# Patient Record
Sex: FEMALE | Race: WHITE | Employment: OTHER | URBAN - METROPOLITAN AREA
[De-identification: names, ages, dates, MRNs, and addresses within clinical notes are randomized per-mention and may not be internally consistent; named-entity substitution may affect disease eponyms.]

---

## 2021-01-01 ENCOUNTER — APPOINTMENT (OUTPATIENT)
Dept: GENERAL RADIOLOGY | Age: 86
DRG: 535 | End: 2021-01-01
Payer: MEDICARE

## 2021-01-01 ENCOUNTER — APPOINTMENT (OUTPATIENT)
Dept: CT IMAGING | Age: 86
DRG: 535 | End: 2021-01-01
Payer: MEDICARE

## 2021-01-01 ENCOUNTER — HOSPITAL ENCOUNTER (INPATIENT)
Age: 86
LOS: 1 days | DRG: 535 | End: 2021-02-11
Attending: STUDENT IN AN ORGANIZED HEALTH CARE EDUCATION/TRAINING PROGRAM | Admitting: HOSPITALIST
Payer: MEDICARE

## 2021-01-01 VITALS
TEMPERATURE: 99.4 F | OXYGEN SATURATION: 94 % | BODY MASS INDEX: 21.6 KG/M2 | WEIGHT: 110 LBS | HEIGHT: 60 IN | SYSTOLIC BLOOD PRESSURE: 160 MMHG | DIASTOLIC BLOOD PRESSURE: 71 MMHG | RESPIRATION RATE: 17 BRPM | HEART RATE: 112 BPM

## 2021-01-01 DIAGNOSIS — J18.9 PNEUMONIA DUE TO ORGANISM: ICD-10-CM

## 2021-01-01 DIAGNOSIS — J96.21 ACUTE ON CHRONIC RESPIRATORY FAILURE WITH HYPOXIA (HCC): Primary | ICD-10-CM

## 2021-01-01 LAB
A/G RATIO: 0.9 (ref 1.1–2.2)
ALBUMIN SERPL-MCNC: 3.2 G/DL (ref 3.4–5)
ALP BLD-CCNC: 188 U/L (ref 40–129)
ALT SERPL-CCNC: 23 U/L (ref 10–40)
ANION GAP SERPL CALCULATED.3IONS-SCNC: 12 MMOL/L (ref 3–16)
AST SERPL-CCNC: 38 U/L (ref 15–37)
BASOPHILS ABSOLUTE: 0.1 K/UL (ref 0–0.2)
BASOPHILS RELATIVE PERCENT: 0.6 %
BILIRUB SERPL-MCNC: 0.6 MG/DL (ref 0–1)
BILIRUBIN URINE: NEGATIVE
BILIRUBIN URINE: NEGATIVE
BLOOD, URINE: ABNORMAL
BLOOD, URINE: NEGATIVE
BUN BLDV-MCNC: 72 MG/DL (ref 7–20)
CALCIUM SERPL-MCNC: 8.8 MG/DL (ref 8.3–10.6)
CHLORIDE BLD-SCNC: 103 MMOL/L (ref 99–110)
CHLORIDE URINE RANDOM: 22 MMOL/L
CLARITY: ABNORMAL
CLARITY: CLEAR
CO2: 22 MMOL/L (ref 21–32)
COLOR: YELLOW
COLOR: YELLOW
COMMENT UA: ABNORMAL
COMMENT UA: ABNORMAL
CREAT SERPL-MCNC: 2.5 MG/DL (ref 0.6–1.2)
CREATININE URINE: 90.6 MG/DL (ref 28–259)
CRYSTALS, UA: ABNORMAL /HPF
EKG ATRIAL RATE: 69 BPM
EKG DIAGNOSIS: NORMAL
EKG P AXIS: 58 DEGREES
EKG P-R INTERVAL: 142 MS
EKG Q-T INTERVAL: 432 MS
EKG QRS DURATION: 90 MS
EKG QTC CALCULATION (BAZETT): 462 MS
EKG R AXIS: -49 DEGREES
EKG T AXIS: 56 DEGREES
EKG VENTRICULAR RATE: 69 BPM
EOSINOPHIL,URINE: NORMAL
EOSINOPHILS ABSOLUTE: 0.1 K/UL (ref 0–0.6)
EOSINOPHILS RELATIVE PERCENT: 0.6 %
EPITHELIAL CELLS, UA: 13 /HPF (ref 0–5)
EPITHELIAL CELLS, UA: 4 /HPF (ref 0–5)
GFR AFRICAN AMERICAN: 22
GFR NON-AFRICAN AMERICAN: 18
GLOBULIN: 3.6 G/DL
GLUCOSE BLD-MCNC: 105 MG/DL (ref 70–99)
GLUCOSE URINE: NEGATIVE MG/DL
GLUCOSE URINE: NEGATIVE MG/DL
HCT VFR BLD CALC: 34.2 % (ref 36–48)
HEMOGLOBIN: 10.9 G/DL (ref 12–16)
HYALINE CASTS: 8 /LPF (ref 0–8)
KETONES, URINE: NEGATIVE MG/DL
KETONES, URINE: NEGATIVE MG/DL
LACTIC ACID: 1.8 MMOL/L (ref 0.4–2)
LEUKOCYTE ESTERASE, URINE: ABNORMAL
LEUKOCYTE ESTERASE, URINE: NEGATIVE
LYMPHOCYTES ABSOLUTE: 0.7 K/UL (ref 1–5.1)
LYMPHOCYTES RELATIVE PERCENT: 5.1 %
MCH RBC QN AUTO: 30.3 PG (ref 26–34)
MCHC RBC AUTO-ENTMCNC: 31.9 G/DL (ref 31–36)
MCV RBC AUTO: 94.8 FL (ref 80–100)
MICROSCOPIC EXAMINATION: YES
MICROSCOPIC EXAMINATION: YES
MONOCYTES ABSOLUTE: 0.8 K/UL (ref 0–1.3)
MONOCYTES RELATIVE PERCENT: 6.2 %
NEUTROPHILS ABSOLUTE: 11.7 K/UL (ref 1.7–7.7)
NEUTROPHILS RELATIVE PERCENT: 87.5 %
NITRITE, URINE: NEGATIVE
NITRITE, URINE: NEGATIVE
PDW BLD-RTO: 14.5 % (ref 12.4–15.4)
PH UA: 5.5 (ref 5–8)
PH UA: 5.5 (ref 5–8)
PLATELET # BLD: 450 K/UL (ref 135–450)
PMV BLD AUTO: 8.3 FL (ref 5–10.5)
POTASSIUM REFLEX MAGNESIUM: 4.9 MMOL/L (ref 3.5–5.1)
POTASSIUM, UR: 45.8 MMOL/L
PRO-BNP: 1634 PG/ML (ref 0–449)
PROCALCITONIN: 0.5 NG/ML (ref 0–0.15)
PROTEIN PROTEIN: 43 MG/DL
PROTEIN UA: 30 MG/DL
PROTEIN UA: 30 MG/DL
RBC # BLD: 3.6 M/UL (ref 4–5.2)
RBC UA: 221 /HPF (ref 0–4)
RBC UA: ABNORMAL /HPF (ref 0–4)
RENAL EPITHELIAL, UA: ABNORMAL /HPF (ref 0–1)
SARS-COV-2, NAAT: NOT DETECTED
SODIUM BLD-SCNC: 137 MMOL/L (ref 136–145)
SODIUM URINE: 40 MMOL/L
SPECIFIC GRAVITY UA: 1.02 (ref 1–1.03)
SPECIFIC GRAVITY UA: 1.02 (ref 1–1.03)
TOTAL PROTEIN: 6.8 G/DL (ref 6.4–8.2)
TROPONIN: 0.05 NG/ML
TROPONIN: 0.06 NG/ML
UREA NITROGEN, UR: 613.8 MG/DL (ref 800–1666)
URINE REFLEX TO CULTURE: YES
URINE TYPE: ABNORMAL
URINE TYPE: ABNORMAL
UROBILINOGEN, URINE: 1 E.U./DL
UROBILINOGEN, URINE: 1 E.U./DL
WBC # BLD: 13.3 K/UL (ref 4–11)
WBC UA: 12 /HPF (ref 0–5)
WBC UA: 13 /HPF (ref 0–5)

## 2021-01-01 PROCEDURE — 6360000002 HC RX W HCPCS: Performed by: HOSPITALIST

## 2021-01-01 PROCEDURE — 92526 ORAL FUNCTION THERAPY: CPT

## 2021-01-01 PROCEDURE — 82436 ASSAY OF URINE CHLORIDE: CPT

## 2021-01-01 PROCEDURE — 82570 ASSAY OF URINE CREATININE: CPT

## 2021-01-01 PROCEDURE — U0002 COVID-19 LAB TEST NON-CDC: HCPCS

## 2021-01-01 PROCEDURE — 85025 COMPLETE CBC W/AUTO DIFF WBC: CPT

## 2021-01-01 PROCEDURE — 92610 EVALUATE SWALLOWING FUNCTION: CPT

## 2021-01-01 PROCEDURE — 2580000003 HC RX 258: Performed by: STUDENT IN AN ORGANIZED HEALTH CARE EDUCATION/TRAINING PROGRAM

## 2021-01-01 PROCEDURE — 87086 URINE CULTURE/COLONY COUNT: CPT

## 2021-01-01 PROCEDURE — 83605 ASSAY OF LACTIC ACID: CPT

## 2021-01-01 PROCEDURE — 70450 CT HEAD/BRAIN W/O DYE: CPT

## 2021-01-01 PROCEDURE — 83880 ASSAY OF NATRIURETIC PEPTIDE: CPT

## 2021-01-01 PROCEDURE — 80053 COMPREHEN METABOLIC PANEL: CPT

## 2021-01-01 PROCEDURE — 87205 SMEAR GRAM STAIN: CPT

## 2021-01-01 PROCEDURE — 6360000002 HC RX W HCPCS: Performed by: NURSE PRACTITIONER

## 2021-01-01 PROCEDURE — 93010 ELECTROCARDIOGRAM REPORT: CPT | Performed by: INTERNAL MEDICINE

## 2021-01-01 PROCEDURE — 2580000003 HC RX 258: Performed by: HOSPITALIST

## 2021-01-01 PROCEDURE — 84133 ASSAY OF URINE POTASSIUM: CPT

## 2021-01-01 PROCEDURE — 36415 COLL VENOUS BLD VENIPUNCTURE: CPT

## 2021-01-01 PROCEDURE — 84300 ASSAY OF URINE SODIUM: CPT

## 2021-01-01 PROCEDURE — 99222 1ST HOSP IP/OBS MODERATE 55: CPT | Performed by: ORTHOPAEDIC SURGERY

## 2021-01-01 PROCEDURE — 2700000000 HC OXYGEN THERAPY PER DAY

## 2021-01-01 PROCEDURE — 99284 EMERGENCY DEPT VISIT MOD MDM: CPT

## 2021-01-01 PROCEDURE — 84540 ASSAY OF URINE/UREA-N: CPT

## 2021-01-01 PROCEDURE — 84484 ASSAY OF TROPONIN QUANT: CPT

## 2021-01-01 PROCEDURE — 94760 N-INVAS EAR/PLS OXIMETRY 1: CPT

## 2021-01-01 PROCEDURE — 72170 X-RAY EXAM OF PELVIS: CPT

## 2021-01-01 PROCEDURE — 73100 X-RAY EXAM OF WRIST: CPT

## 2021-01-01 PROCEDURE — 6360000002 HC RX W HCPCS: Performed by: STUDENT IN AN ORGANIZED HEALTH CARE EDUCATION/TRAINING PROGRAM

## 2021-01-01 PROCEDURE — 73140 X-RAY EXAM OF FINGER(S): CPT

## 2021-01-01 PROCEDURE — 84145 PROCALCITONIN (PCT): CPT

## 2021-01-01 PROCEDURE — 93005 ELECTROCARDIOGRAM TRACING: CPT | Performed by: STUDENT IN AN ORGANIZED HEALTH CARE EDUCATION/TRAINING PROGRAM

## 2021-01-01 PROCEDURE — 94669 MECHANICAL CHEST WALL OSCILL: CPT

## 2021-01-01 PROCEDURE — 2060000000 HC ICU INTERMEDIATE R&B

## 2021-01-01 PROCEDURE — 71045 X-RAY EXAM CHEST 1 VIEW: CPT

## 2021-01-01 PROCEDURE — 84156 ASSAY OF PROTEIN URINE: CPT

## 2021-01-01 PROCEDURE — 81001 URINALYSIS AUTO W/SCOPE: CPT

## 2021-01-01 RX ORDER — POTASSIUM CHLORIDE 20 MEQ/1
40 TABLET, EXTENDED RELEASE ORAL PRN
Status: DISCONTINUED | OUTPATIENT
Start: 2021-01-01 | End: 2021-01-01

## 2021-01-01 RX ORDER — ACETAMINOPHEN 650 MG/1
650 SUPPOSITORY RECTAL EVERY 6 HOURS PRN
Status: DISCONTINUED | OUTPATIENT
Start: 2021-01-01 | End: 2021-02-12 | Stop reason: HOSPADM

## 2021-01-01 RX ORDER — HEPARIN SODIUM 5000 [USP'U]/ML
5000 INJECTION, SOLUTION INTRAVENOUS; SUBCUTANEOUS EVERY 8 HOURS SCHEDULED
Status: DISCONTINUED | OUTPATIENT
Start: 2021-01-01 | End: 2021-02-12 | Stop reason: HOSPADM

## 2021-01-01 RX ORDER — MORPHINE SULFATE 2 MG/ML
2 INJECTION, SOLUTION INTRAMUSCULAR; INTRAVENOUS ONCE
Status: COMPLETED | OUTPATIENT
Start: 2021-01-01 | End: 2021-01-01

## 2021-01-01 RX ORDER — OLANZAPINE 10 MG/1
5 INJECTION, POWDER, LYOPHILIZED, FOR SOLUTION INTRAMUSCULAR ONCE
Status: DISCONTINUED | OUTPATIENT
Start: 2021-01-01 | End: 2021-02-12 | Stop reason: HOSPADM

## 2021-01-01 RX ORDER — SODIUM CHLORIDE 9 MG/ML
INJECTION, SOLUTION INTRAVENOUS CONTINUOUS
Status: DISCONTINUED | OUTPATIENT
Start: 2021-01-01 | End: 2021-02-12 | Stop reason: HOSPADM

## 2021-01-01 RX ORDER — HALOPERIDOL 5 MG/ML
4 INJECTION INTRAMUSCULAR ONCE
Status: COMPLETED | OUTPATIENT
Start: 2021-01-01 | End: 2021-01-01

## 2021-01-01 RX ORDER — PROMETHAZINE HYDROCHLORIDE 25 MG/1
12.5 TABLET ORAL EVERY 6 HOURS PRN
Status: DISCONTINUED | OUTPATIENT
Start: 2021-01-01 | End: 2021-02-12 | Stop reason: HOSPADM

## 2021-01-01 RX ORDER — SODIUM CHLORIDE 0.9 % (FLUSH) 0.9 %
10 SYRINGE (ML) INJECTION PRN
Status: DISCONTINUED | OUTPATIENT
Start: 2021-01-01 | End: 2021-02-12 | Stop reason: HOSPADM

## 2021-01-01 RX ORDER — POLYETHYLENE GLYCOL 3350 17 G/17G
17 POWDER, FOR SOLUTION ORAL DAILY PRN
Status: DISCONTINUED | OUTPATIENT
Start: 2021-01-01 | End: 2021-02-12 | Stop reason: HOSPADM

## 2021-01-01 RX ORDER — SODIUM CHLORIDE 0.9 % (FLUSH) 0.9 %
10 SYRINGE (ML) INJECTION EVERY 12 HOURS SCHEDULED
Status: DISCONTINUED | OUTPATIENT
Start: 2021-01-01 | End: 2021-02-12 | Stop reason: HOSPADM

## 2021-01-01 RX ORDER — POTASSIUM CHLORIDE 7.45 MG/ML
10 INJECTION INTRAVENOUS PRN
Status: DISCONTINUED | OUTPATIENT
Start: 2021-01-01 | End: 2021-01-01

## 2021-01-01 RX ORDER — ONDANSETRON 2 MG/ML
4 INJECTION INTRAMUSCULAR; INTRAVENOUS EVERY 6 HOURS PRN
Status: DISCONTINUED | OUTPATIENT
Start: 2021-01-01 | End: 2021-02-12 | Stop reason: HOSPADM

## 2021-01-01 RX ORDER — ACETAMINOPHEN 325 MG/1
650 TABLET ORAL EVERY 6 HOURS PRN
Status: DISCONTINUED | OUTPATIENT
Start: 2021-01-01 | End: 2021-02-12 | Stop reason: HOSPADM

## 2021-01-01 RX ORDER — MAGNESIUM SULFATE IN WATER 40 MG/ML
2000 INJECTION, SOLUTION INTRAVENOUS PRN
Status: DISCONTINUED | OUTPATIENT
Start: 2021-01-01 | End: 2021-01-01

## 2021-01-01 RX ORDER — LORAZEPAM 2 MG/ML
1 INJECTION INTRAMUSCULAR
Status: DISCONTINUED | OUTPATIENT
Start: 2021-01-01 | End: 2021-02-12 | Stop reason: HOSPADM

## 2021-01-01 RX ORDER — HALOPERIDOL 5 MG/ML
2 INJECTION INTRAMUSCULAR ONCE
Status: COMPLETED | OUTPATIENT
Start: 2021-01-01 | End: 2021-01-01

## 2021-01-01 RX ORDER — MORPHINE SULFATE 2 MG/ML
2 INJECTION, SOLUTION INTRAMUSCULAR; INTRAVENOUS
Status: DISCONTINUED | OUTPATIENT
Start: 2021-01-01 | End: 2021-02-12 | Stop reason: HOSPADM

## 2021-01-01 RX ADMIN — SODIUM CHLORIDE: 9 INJECTION, SOLUTION INTRAVENOUS at 10:32

## 2021-01-01 RX ADMIN — HALOPERIDOL LACTATE 4 MG: 5 INJECTION, SOLUTION INTRAMUSCULAR at 10:50

## 2021-01-01 RX ADMIN — AZITHROMYCIN MONOHYDRATE 500 MG: 500 INJECTION, POWDER, LYOPHILIZED, FOR SOLUTION INTRAVENOUS at 06:24

## 2021-01-01 RX ADMIN — HEPARIN SODIUM 5000 UNITS: 5000 INJECTION INTRAVENOUS; SUBCUTANEOUS at 13:33

## 2021-01-01 RX ADMIN — MORPHINE SULFATE 2 MG: 2 INJECTION, SOLUTION INTRAMUSCULAR; INTRAVENOUS at 12:56

## 2021-01-01 RX ADMIN — HEPARIN SODIUM 5000 UNITS: 5000 INJECTION INTRAVENOUS; SUBCUTANEOUS at 10:51

## 2021-01-01 RX ADMIN — Medication 1000 MG: at 06:24

## 2021-01-01 RX ADMIN — Medication 10 ML: at 10:32

## 2021-01-01 RX ADMIN — ONDANSETRON 4 MG: 2 INJECTION INTRAMUSCULAR; INTRAVENOUS at 10:50

## 2021-01-01 RX ADMIN — HALOPERIDOL LACTATE 2 MG: 5 INJECTION, SOLUTION INTRAMUSCULAR at 06:24

## 2021-01-01 SDOH — HEALTH STABILITY: MENTAL HEALTH: HOW OFTEN DO YOU HAVE A DRINK CONTAINING ALCOHOL?: NEVER

## 2021-02-11 PROBLEM — J18.9 PNA (PNEUMONIA): Status: ACTIVE | Noted: 2021-01-01

## 2021-02-11 NOTE — ED NOTES
Pt continually pulling at mcfadden catheter and taking nasal cannula out of her nose. Pt medicated per order at this time. All vital signs remain stable. Fall precautions in place. Pt repositioned for comfort at this time. Will continue to monitor.       Michael Weaver RN  02/11/21 0224

## 2021-02-11 NOTE — PROGRESS NOTES
Patient arrived from ED via stretcher, unable to follow commands, poor safety  awereness  confused , agitated and restless, pulling on mcfadden, iv and taking out nasal cannula  sat > 90 on 3l/nc  dyspnea on exertion, restraint applied  Per hospital ist order , day shift rn updated in handoff, plan of care continue

## 2021-02-11 NOTE — PROGRESS NOTES
CLINICAL BEDSIDE SWALLOWING EVALUATION  Speech Therapy Department    Patient Name:  Jacoby Wray  :  3/7/1925  Pain level: Pt did not report pain  Medical Diagnosis:   PNA (pneumonia) [J18.9]    HPI: Per chart: Jacoby Wray is a 80 y.o. female who presented with   · Comes from home   · Worsening Mentation per family @ Home in last few days   · Also inc SOB noted By [facility] @ Home in last few days   · Noted fall @ 21 and patient taken to [facility] [place] and noted to have Fractures . Planned conservative RX and  OP Ortho f/u recommended at that time      Chest x-ray:  Pulmonary edema versus bilateral pneumonia. CT of Head:  Limited study with small vessel chronic ischemic changes but no definite   acute intracranial abnormality. Treatment Diagnosis: Moderate Oropharyngeal Dysphagia, recommend ongoing assessment     Impressions: Pt seen with daughter present. Pt with constant moving and twitching in bed, unable to follow basic commands. Pt's daughter stated prior to fall and onset of UTI Pt was eating soft foods at home and could feed herself, no difficulty with swallowing was reported. Pt's daughter reported current state is not her baseline. Minimal PO trials were provided due to inability to follow commands and current cognitive status. Pt with leaning to the right and open mouth congested breathing noted. RN reported Pt had consumed water earlier this date. Minimal trials of thin liquid via straw were provided. Pt with intermittent \"blowing\" into straw instead of sucking, however was able to form labial seal on straw. Reduced bolus control with rapid bolus loss to pharynx and delayed swallow initiation noted. Pt with intermittent delayed congested coughing noted. Due to Pt's cognitive status, inability to follow commands, and suspected reduced airway protection, no further PO trials were provided. Recommend NPO with ongoing assessment of swallow function. Dietary Recommendations: NPO with oral care as able and ongoing swallowing assessment     Strategies: 90 degree positioning with all p.o. intake; small bites/sips; alternate textures through meal; reduce rate of intake    Treatment/Goals: Speech therapy for dysphagia tx 3-5 times per week. ST.) Pt will tolerate ongoing swallowing assessment as indicated     Oral motor Exam:  Dentition: dentures   Labial/Facial: unable to fully assess   Lingual: unable to fully assess   Voice: unable to assess, congested breathing noted    Oral Phase:   Apparent premature bolus loss to pharynx    Pharyngeal Phase:  Apparent pharyngeal pooling  Delayed swallow initiation   Decreased laryngeal elevation via palpation   Intermittent coughing (delayed) with thin liquids     Patient/Family Education:Education, results and recommendations given to the Pt's daughter and nurse, who verbalized understanding    Timed Code Treatment: 0 minutes    Total Treatment Time: 23 minutes     Discharge Recommendations: Speech Therapy for Speech/Dysphagia treatment at discharge. If patient discharges prior to next session this note will serve as a discharge summary.       Chato Xiong M.A., [address] [place] [address]  Speech-Language Pathologist

## 2021-02-11 NOTE — ED NOTES
Report given to 3T RN at this time. Pt to floor with antibiotics infusing and on telemetry monitor.       Zane Sanchez RN  02/11/21 6809

## 2021-02-11 NOTE — H&P
· Recent Minimally displaced fracture of the left inferior pubic ramus with small associated hematoma @ 1/29/21 . Conservative Rx planned . ? Ortho c/s for recommendations in house   · Distal radius fracture @ 1/29/21 . Conservative Rx planned . ? Ortho c/s for recommendations in house   · Mildly comminuted, and likely intra-articular fracture at the base of the fifth proximal phalanx @ 1/29/21 . Conservative Rx planned . ? Ortho c/s for recommendations in house         ·  Home medications for chronic medical problems  reviewed and Held / Resumed / Pertinent changes made [as mentioned above] in home medications, as clinically warranted/Indicated . See EPIC orders for details         · DVT Prophylaxis . Heparin SQ  ; + SCDs   · Nutrition/Diet. Diet NPO Effective Now  · Code Status . No Order  · PT/OT and ambulatory Eval Status. Ambulate with Assist   · Probable LOS & future Disposition planned post discharge . Home vs ECF in 2-3 days       Please see EPIC orders for detailed orders/recommendations of plan and medications. CONSULTS ORDERED @ ADMISSION  ? IP CONSULT TO HOSPITALIST  ? IP CONSULT TO NEPHROLOGY      Medical Decision Making : HIGH     Total patient Care [ Direct and Indirect ] time spent in evaluating the patient an discussing plan with appropriate staff/patient/family members is 64 min       Wily Norman MD    Hospitalist, [facility].    2/11/2021 6:17 AM

## 2021-02-11 NOTE — CONSULTS
[facility] Orthopedic Surgery  Consult Note    Patient: Toby Mosher  Admit Date: 2/11/2021  Requesting Physician: Theresa Greene MD  Room: [address] [place], [place] [id number]    Chief complaint: Confusion and SOB    HPI: Toby Mosher is a 80 y.o. female who presented to [facility] yesterday without complaints of increased confusion and SOB. She was seen at [facility] on 1/29 for a fall at home and noted to have inferior left pubic rami fracture, left distal radius fracture and left proximal phalanx fracture of the 5th digit. She was placed in a thumb spica splint and limited splint was placed on her left fifth digit and she returned home with home care. She developed a UTI and ultimately her mentation significantly declined. She does have baseline dementia. She is currently nonverbal, agitated does have baseline dementia although her daughter at bedside reports that she typically can converse. She has been placed in wrist restraints overnight due to her agitation. Imaging review of left fifth digit demonstrated: Nondisplaced fracture of the base of the proximal fifth phalanx. Imaging review of the left wrist obtained today demonstrated: A slightly shortened and comminuted intra-articular distal radius fracture. There is also a nondisplaced ulnar styloid fracture. Imaging reviewed of the pelvis obtained today shows bilateral superior and inferior pubic rami fractures. Left total hip arthroplasty hardware is noted to be intact without complication. Patient lives with her daughter at home and uses a walker to ambulate. She did sustain a left hip fracture while living [facility] [place] which was treated with total hip arthroplasty in March of 2020. Medical History:  History reviewed. No pertinent past medical history. History reviewed. No pertinent surgical history. Social History:    reports that she has never smoked.  She has never used smokeless tobacco. Family History:  History reviewed. No pertinent family history. Medications:  ALL MEDICATIONS HAVE BEEN REVIEWED:  Scheduled:   sodium chloride flush  10 mL Intravenous 2 times per day    heparin (porcine)  5,000 Units Subcutaneous 3 times per day    [START ON 2/12/2021] cefTRIAXone (ROCEPHIN) IV  1,000 mg Intravenous Q24H    [START ON 2/12/2021] azithromycin  500 mg Intravenous Q24H    haloperidol lactate  4 mg Intramuscular Once     Continuous:   sodium chloride       PRN:sodium chloride flush, promethazine **OR** ondansetron, polyethylene glycol, acetaminophen **OR** acetaminophen    Allergies: No Known Allergies    Review of Systems:  Constitutional: Negative for fever, chills, fatigue. Skin:  Negative for pruritis, rash  Eyes: Negative for photophobia and visual disturbance. ENT:  Negative for rhinorrhea, epistaxis, sore throat  Respiratory:  Negative for cough and shortness of breath. Cardiovascular: Negative for chest pain. Gastrointestinal: Negative for nausea, vomiting, diarrhea. Genitourinary: Negative for dysuria and difficulty urinating. Neurological: Negative for confusion, dysarthria, tremors, seizures. Psychiatric:  Negative for depression or anxiety  Musculoskeletal:  Positive for left wrist pain. Objective:  Vitals:    02/11/21 0657   BP: (!) 101/47   Pulse: 84   Resp: 20   Temp: 99.5 °F (37.5 °C)   SpO2: 94%      Physical Examination:  GENERAL: No apparent distress, well-nourished  SKIN:  Warm and dry  EYES: Nonicteric. ENT: Mucous membranes moist  HEAD: Normocephalic, atraumatic  RESPIRATORY: Resp easy and unlabored  CARDIOVASCULAR: Regular rate and rhythm  GI: Abdomen soft, nontender  NEURO: Awake and alert.   No speech defect  PSYCHIATRIC: Appropriate affect; not agitated  MUSCULOSKELETAL: * No resolved hospital problems. *    RECOMMENDATIONS:  We discussed both operative and non-operative treatments. Nonoperative treatment recommended regarding the left inferior pubic rami fracture. Given the patient's advanced age, and minimal activity level, the daughter/POA prefers to pursue nonoperative treatments regarding distal radius fracture which is certainly appropriate. She has already removed the thumb spica splint placed at the [facility] as well as her aluminum splint for the left fifth digit. Given her current agitation status, the daughter requested that we not replace any splints at this time. I will return tomorrow to reevaluate her mentation and consider splint placement at that time. - WBAT with ady platform walker. NO weight bearing through left wrist.  Please avoid using wrist restraints on the left. - Pain control per primary team.  - DVT prophylaxis: heparin as inpt  - PT/OT  - Dispo: per primary team; ideally family would like to be able to take her home with home care, but is open to considering a short stent of rehab. Patient denies tobacco use. I have reviewed imaging and plan with Dr. Libra Aggarwal. JOSE A Brunson  2/11/2021  9:40 AM                                              Orthopaedic Surgery Attending Note      I have personally seen and examined Joy Garcia. I have fully participated in the care of this patient. I have reviewed and agree with all pertinent clinical information including history, physical exam, diagnostic testing and the plan by Ramila Werner CNP unless otherwise noted. Briefly, Ms. Gerhardt Mccreedy is a 80 y.o. female with history of dementia who presented to [facility] emergency room with increased confusion and shortness of breath. She was previously seen at [facility] on 1/29 for a fall at home and found to have inferior left pubic rami fracture, left distal radius fracture, and left proximal phalanx fracture of the small digit. She is currently extremely agitated and demented and provides no history. Seen at the bedside with her daughter, who writes most of the history. Daughter states that she was doing pretty well at home with home PT. She developed a UTI, and has had mental status changes since. She states her mother removed the splint on her left upper extremity at home. Review of systems:  Unable to perform review of systems given acute mental status change. BP (!) 160/71   Pulse 112   Temp 99.4 °F (37.4 °C) (Axillary)   Resp 17   Ht 5' (1.524 m)   Wt 110 lb (49.9 kg)   SpO2 94%   BMI 21.48 kg/m²   Patient is lying in bed, agitated. He is moving around. She does not respond to commands and does not converse. She does not appear to be in acute pain while in bed. There is a wrist restraint on her right arm. She does have tenderness palpation to the left wrist.  There is slight deformity of the left wrist as well as ecchymosis of her left wrist.  She appears to be grossly neurovascularly intact in the bilateral upper and lower extremities. Left wrist and finger x-ray: I have reviewed the patient's medical history in detail and updated the computerized patient record. There is an impacted fracture of the distal radial metaphysis with slight angulation. The fracture at the tip of the ulnar styloid. Nondisplaced fracture through the proximal shaft of the fifth finger proximal phalanx. AP pelvis x-ray: Intact left bipolar hip arthroplasty. Left inferior pubic ramus fracture.       Assessment: Left distal radius and ulnar styloid fracture. Left fifth finger proximal phalanx fracture. Left inferior pubic ramus fracture. Dementia. Acute mental status change. Urinary tract infection. Osteoporosis    Plan:  We discussed the patient's fractures with the patient's daughter. This that I agree with her wishes as well as what was discussed with her with by Maria A Yoo NP. She can be weightbearing as tolerated on her left lower extremity. Try to use a platform walker once her mentation improves. PT/OT consults at that time. Given her age and activity level I do agree that we can try to treat her left distal radius fracture nonoperatively. We discussed long-term consequences of this fracture being posttraumatic arthritis. Given her age as well as activity level I do not think this will be a problem. We will try to avoid any wrist restraints on the left side. Can try to reapply a splint if she will tolerate that. Otherwise just symptomatic pain control. DVT prophylaxis. She can follow-up with me or the orthopedic surgeon that she was going to see if from [address] [place] [address].  Chris Hoover MD  Orthopaedic Surgery and Sports Medicine   Electronically signed by Mariza Pillai MD on 2/11/2021 at 2:21 PM

## 2021-02-11 NOTE — CARE COORDINATION
Discharge Planning Assessment  RN discharge planner met with patient/ (and family member) to discuss reason for admission, current living situation, and potential needs at the time of discharge    Demographics/Insurance verified Yes demographics verified with patient's daughter    Current type of dwellin level home    Patient from ECF/SW confirmed with:    Living arrangements: with daughter    Level of function/Support: able to bathe self w/ assistance, dress, ambulated with walker until pelvic fracture which happened in past few weeks    PCP: Leesa Chi    Last Visit to PCP:  In past few months    DME: stair lift, walker, wheelchair, transport chair is on order,. 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    Active with any community resources/agencies/skilled home care: [address]     Medication compliance issues: difficulty swallowing medicines, prefers medication crushed, does not have Medicare part D, uses a [employer] prescription plan    Financial issues that could impact healthcare:    Tentative discharge plan:return home with daughter, home care with [facility]    *Discussed and provided facilities of choice if transition to a skilled nursing facility is required at the time of discharge  [facility]    *Discussed with patient and/or family that on the day of discharge home tentative time of discharge will be between 10 AM and noon.     Transportation at the time of discharge:pending clinical course,CM will arrange to facility    HAN Kinney, CCM, RN  [facility]  [phone]

## 2021-02-11 NOTE — ED PROVIDER NOTES
Primary Care Physician: No primary care provider on file. Attending Physician: No att. providers found     History   Chief Complaint   Patient presents with    Shortness of Breath     pt brought in by squad from home. pt found \"gasping for air\". pt altered at baseline. pt with recent dx of uti and pelvic fracture. RUBY Mccord is a 80 y.o. female history of dementia, recently with hip fracture at home with home health physical therapy presenting this morning accompanied by daughter and son who indicated that patient all of a sudden became short of breath. Patient is nonverbal and demented and would not provide history. Information was obtained from daughter and son who indicated that since patient had her fracture a few weeks ago she has not been herself. She is recently being treated for UTI with antibiotics. At home now she is needing more help than usual.  This morning per family seems as she was having difficulties breathing. EMS was called and patient was brought to the emergency room. History reviewed. No pertinent past medical history. History reviewed. No pertinent surgical history. History reviewed. No pertinent family history. Social History     Socioeconomic History    Marital status:       Spouse name: None    Number of children: None    Years of education: None    Highest education level: None   Occupational History    None   Social Needs    Financial resource strain: None    Food insecurity     Worry: None     Inability: None    Transportation needs     Medical: None     Non-medical: None   Tobacco Use    Smoking status: Never Smoker    Smokeless tobacco: Never Used   Substance and Sexual Activity    Alcohol use: Never     Frequency: Never    Drug use: Never    Sexual activity: None   Lifestyle    Physical activity     Days per week: None     Minutes per session: None    Stress: None   Relationships    Social connections Talks on phone: None     Gets together: None     Attends Sikh service: None     Active member of club or organization: None     Attends meetings of clubs or organizations: None     Relationship status: None    Intimate partner violence     Fear of current or ex partner: None     Emotionally abused: None     Physically abused: None     Forced sexual activity: None   Other Topics Concern    None   Social History Narrative    None        Review of Systems   10 total systems reviewed but unable to obtain    Physical Exam   BP (!) 160/71   Pulse 112   Temp 99.4 °F (37.4 °C) (Axillary)   Resp 17   Ht 5' (1.524 m)   Wt 110 lb (49.9 kg)   SpO2 94%   BMI 21.48 kg/m²      CONSTITUTIONAL: ill appearing, in no acute distress   HEAD: atraumatic, normocephalic   EYES: PERRL, No injection, discharge or scleral icterus. ENT: Moist mucous membranes. NECK: Normal ROM, NO LAD   CARDIOVASCULAR: Regular rate and rhythm. No murmurs or gallop. PULMONARY/CHEST: Respiratory distress  ABDOMEN: Soft, Non-distended and non-tender, without guarding or rebound. SKIN: Acyanotic, warm, dry   MUSCULOSKELETAL: No swelling, tenderness or deformity   NEUROLOGICAL: Altered, pulses intact. Grossly nonfocal   Nursing note and vitals reviewed.      ED Course & Medical Decision Making   Medications   cefTRIAXone (ROCEPHIN) 1000 mg in sterile water 10 mL IV syringe (1,000 mg Intravenous Given 2/11/21 0624)     And   azithromycin (ZITHROMAX) 500 mg in D5W 250ml Vial Mate (0 mg Intravenous Stopped 2/11/21 0832)   haloperidol lactate (HALDOL) injection 2 mg (2 mg Intramuscular Given 2/11/21 0624)   haloperidol lactate (HALDOL) injection 4 mg (4 mg Intramuscular Given 2/11/21 1050)   morphine (PF) injection 2 mg (2 mg Intravenous Given 2/11/21 1256)      Labs Reviewed   CBC WITH AUTO DIFFERENTIAL - Abnormal; Notable for the following components:       Result Value    WBC 13.3 (*)     RBC 3.60 (*)     Hemoglobin 10.9 (*) Hematocrit 34.2 (*)     Neutrophils Absolute 11.7 (*)     Lymphocytes Absolute 0.7 (*)     All other components within normal limits    Narrative:     Performed at:  [facility] [address]   Phone [phone]   COMPREHENSIVE METABOLIC PANEL W/ REFLEX TO MG FOR LOW K - Abnormal; Notable for the following components:    Glucose 105 (*)     BUN 72 (*)     CREATININE 2.5 (*)     GFR Non- 18 (*)     GFR  22 (*)     Albumin 3.2 (*)     Albumin/Globulin Ratio 0.9 (*)     Alkaline Phosphatase 188 (*)     AST 38 (*)     All other components within normal limits    Narrative:     Performed at:  [facility] [address]   Phone [phone]   TROPONIN - Abnormal; Notable for the following components:    Troponin 0.05 (*)     All other components within normal limits    Narrative:     Performed at:  [facility] [address]   Phone [phone]  - Abnormal; Notable for the following components:    Pro-BNP 1,634 (*)     All other components within normal limits    Narrative:     Performed at:  [facility] [address]   Phone [phone]   URINE RT REFLEX TO CULTURE - Abnormal; Notable for the following components:    Protein, UA 30 (*)     All other components within normal limits    Narrative:     Performed at:  [facility] [address]   Phone [phone]   PROCALCITONIN - Abnormal; Notable for the following components:    Procalcitonin 0.50 (*)     All other components within normal limits    Narrative:     Performed at:  [facility] [address]   Phone [phone] MICROSCOPIC URINALYSIS - Abnormal; Notable for the following components:    Renal Epithelial, UA 11-20 (*)     WBC, UA 12 (*)     Epithelial Cells, UA 13 (*)     All other components within normal limits    Narrative:     Performed at:  [facility] [address],  [place], [facility] [employer] [place]   Phone [phone]   TROPONIN - Abnormal; Notable for the following components:    Troponin 0.06 (*)     All other components within normal limits    Narrative:     Performed at:  [facility] [address],  [place], [facility] [employer] [place]   Phone [phone]   PROTEIN, URINE, RANDOM - Abnormal; Notable for the following components:    Protein, Ur 43.00 (*)     All other components within normal limits    Narrative:     Performed at:  [address] [facility] [address]   Phone [phone]   UREA NITROGEN, URINE - Abnormal; Notable for the following components:    Urea Nitrogen, Ur 613.8 (*)     All other components within normal limits    Narrative:     Performed at:  [address] [facility] [address]   Phone [phone]   URINALYSIS WITH MICROSCOPIC - Abnormal; Notable for the following components:    Clarity, UA TURBID (*)     Blood, Urine LARGE (*)     Protein, UA 30 (*)     Leukocyte Esterase, Urine SMALL (*)     Crystals, UA 2+ Uric Acid (*)     WBC, UA 13 (*)     RBC,  (*)     All other components within normal limits    Narrative:     Performed at:  [facility] [address],  [place], [facility] [employer] [place]   Phone [phone]   CULTURE, URINE    Narrative:     ORDER#: [id number]                          ORDERED BY: Kolby Jeffery  SOURCE: Urine Clean Catch                  COLLECTED:  02/11/21 04:00  ANTIBIOTICS AT JANETH.:                      RECEIVED :  02/11/21 14:20  Performed at: [address] [place] [address] Laboratory  [address] [facility] [place] [address]   Phone [phone]   LEGIONELLA ANTIGEN, URINE   STREP PNEUMONIAE ANTIGEN   CULTURE, RESPIRATORY   LACTIC ACID, PLASMA    Narrative:     Performed at:  [facility] [address], [place], [address]   Phone [phone]   SODIUM, URINE, RANDOM    Narrative:     Performed at:  [facility]  [address], [place] [address]   Phone [phone]   POTASSIUM, URINE, RANDOM    Narrative:     Performed at:  [facility]  [address], [facility] [place] [address]   Phone [phone]   EOSINOPHIL SMEAR URINE    Narrative:     Performed at:  [address] [place] [address]  [address], [facility] [place] [address]   Phone [phone]   CREATININE, RANDOM URINE    Narrative:     Performed at:  [address] [place] [address]  [address], [place] [address]   Phone [phone]   CHLORIDE, URINE, RANDOM    Narrative:     Performed at:  [facility]  [address], [place] [address]   Phone [phone]    Narrative:     Performed at:  [facility] [address], [place], [address] [place] [address]   Phone [phone]      XR FINGER LEFT (MIN 2 VIEWS)   Final Result   Fracture of the 5th proximal phalanx         XR WRIST LEFT (2 VIEWS)   Final Result   Distal radius and ulnar styloid fractures         XR PELVIS (1-2 VIEWS)   Final Result   Bilateral superior and inferior pubic ramus fractures with inferior   displacement of the pubic bone         CT Head WO Contrast   Final Result   Limited study with small vessel chronic ischemic changes but no definite   acute intracranial abnormality. XR CHEST PORTABLE   Final Result   Pulmonary edema versus bilateral pneumonia.             Ct Head Wo Contrast Result Date: 2/11/2021  EXAMINATION: CT OF THE HEAD WITHOUT CONTRAST  2/11/2021 4:45 am TECHNIQUE: CT of the head was performed without the administration of intravenous contrast. Dose modulation, iterative reconstruction, and/or weight based adjustment of the mA/kV was utilized to reduce the radiation dose to as low as reasonably achievable. COMPARISON: None. HISTORY: ORDERING SYSTEM PROVIDED HISTORY: AMS TECHNOLOGIST PROVIDED HISTORY: Reason for exam:->AMS Has a \"code stroke\" or \"stroke alert\" been called? ->No Decision Support Exception->Emergency Medical Condition (MA) Reason for Exam: AMS Acuity: Acute Type of Exam: Initial Relevant Medical/Surgical History: Shortness of Breath (pt brought in by squad from home. pt found \"gasping for air\". pt altered at baseline. pt with recent dx of uti and pelvic fracture. ) FINDINGS: BRAIN/VENTRICLES: Evaluation is limited by motion artifact despite repeat imaging. There is no definite acute hemorrhage. There is mild periventricular and subcortical white matter low attenuation without mass effect or edema. There is no hydrocephalus. ORBITS: The visualized portion of the orbits demonstrate no acute abnormality. SINUSES: Polyp or retention cyst is seen in the anteromedial aspect of the right maxillary antrum the paranasal sinuses and mastoid air cells are otherwise grossly negative. SOFT TISSUES/SKULL:  No acute abnormality of the visualized skull or soft tissues. Limited study with small vessel chronic ischemic changes but no definite acute intracranial abnormality.      Xr Chest Portable    Result Date: 2/11/2021 EXAMINATION: ONE XRAY VIEW OF THE CHEST 2/11/2021 4:08 am COMPARISON: None. HISTORY: ORDERING SYSTEM PROVIDED HISTORY: R/O PNA TECHNOLOGIST PROVIDED HISTORY: Reason for exam:->R/O PNA Reason for Exam: R/O PNA Acuity: Acute Type of Exam: Initial Shortness of breath, leukocytosis FINDINGS: There are ill-defined airspace opacities in both lungs. Trace left pleural effusion may be present. The heart size is normal.  The aorta is tortuous. There is no discernible pneumothorax. Pulmonary edema versus bilateral pneumonia. EKG INTERPRETATION:  EKG by my preliminary interpretation shows sinus rhythm with rate of 69, normal axis, normal intervals, with no ST changes indicative of ischemia at this time.     PROCEDURES:   Procedures    ASSESSMENT AND PLAN: Amount and/or Complexity of Data Reviewed:  Clinical lab tests: ordered and reviewed   Tests in the radiology section of CPT®: ordered and reviewed   Tests in the medicine section of CPT®: ordered and reviewed   Decide to obtain previous medical records or to obtain history from someone other than the patient: yes  Obtain history from someone other than the patient: yes  Review and summarize past medical records:yes  I looked up the patient in our electronic medical record:yes  Discuss the patient with other providers:yes  Independent visualization of images, tracings, or specimens:yes  Risk of Complications, Morbidity, and/or Mortality:Moderate  Presenting problems: moderate Diagnostic procedures: moderate yes Management options: moderate     Critical Care Attestation   Total critical care time: 35 minutes minimum   Critical care time does not include separately billable procedures and treating other patients. Critical care was necessary to treat or prevent imminent or life-threatening deterioration of the following conditions: Pneumonia with hypoxemia. Patient provided with supplemental oxygen and treated urgently in the ED with antibiotics. Case discussed with consultants.   _________________________________________________________________________________________  This record is transcribed utilizing voice recognition technology. There are inherent limitations in this technology. In addition, there may be limitations in editing of this report. If there are any discrepancies, please contact me directly.         Jj Cervantes MD  02/13/21 0708

## 2021-02-11 NOTE — CONSULTS
[facility])  O: [phone]  F: [phone]  Answering Service: [phone]  Nephrology Note - Consult  2/11/2021 10:54 AM       Patient: Celine Lemus [id number]  [id number]  Date of Admit: 2/11/2021 LOS: 0 days  Referring physician: Shelia Haley MD  Outpatient Nephrologist:  None, normal renal function at baseline    HPI/Chief Concern  Celine Lemus is a 80 y.o. female with a past medical history of mild dementia, recent pelvic fracture, recet UTI  who presented 2/11/2021  to [facility] with chief concern AMS, agitation, concern of UTI for whom we are consulted 2/11/21 for RITA. Patient presented from home where she resides with her daughter. Previously residing independently with her  in [place], [place] until he passed away earlier last year. Since then has moved in with her daughter and was doing OK but had progressive decline/dementia over 2020 which acutely worsened after she had a UTI and fall fracturing her hip earlier 2/2021. Since then she has been more agitated and altered, urine has had a foul odor and she has been much more confused prompting her daughter to bring her in. She presented with Scr 2.5 from a normal baseline and was started on Ceftriaxone and IVF. Had seen patient earlier in the morning unfortunately was very agitated / altered; discussed with patient's daughter progressive decline over past month since hip fracture. Returned to check in on her to see how she was doing in the evening she was having agonal breathing and seizure like activity. Daughter did come in to see her again in the evening and acknowledged her mother would want to be made comfortable without aggressive measures. She was made comfort care.       On initial consult renal pertinent ROS included:  + Altered unable to obtain ROS    Review of Systems Significant as listed in HPI otherwise 10 point ROS negative Past medical, family, and social histories were reviewed as previously documented. Updates were made as necessary. Updated & Discussed with: Patient, her daughter, RN staff at bedside, MD staff of code status change. Assessment & Plan  # Non oliguric RITA likely volume depletion; started on IVF  # UTI; on Ceftriaxone; Cx pending  # AMS; likely UTI/suspected aspiration  # Likely Aspiration PNA  # Hip fracture recent  # Goals of care; made comfort care; notified medical team of code status change      Recommendations:   Discussed with daughter and staff; decision made for comfort care measures  RITA likely was volume depletion c/b UTI / poor PO intake / AMS   In line with patient's wishes she had expressed to daughter earlier minimal work up ordered and comfort care measures instituted per primary team.    Thank you for allowing us to participate in the care of this patient. Please call with any questions. Signed:  Israel Webster M.D.   [facility]  Office Number: [phone]  Fax Number: [phone]  2/11/2021, 10:54 AM  Past Medical/Family/Social/Surgical History   She has past medical history mild dementia    She recent pelvic fracture    She  reports that she has never smoked. She has never used smokeless tobacco. She reports that she does not drink alcohol or use drugs. Her family hx does not include renal disease  Medications   Inpatient Meds:  No medications prior to admission.     Scheduled Meds:   sodium chloride flush  10 mL Intravenous 2 times per day    heparin (porcine)  5,000 Units Subcutaneous 3 times per day    [START ON 2/12/2021] cefTRIAXone (ROCEPHIN) IV  1,000 mg Intravenous Q24H    [START ON 2/12/2021] azithromycin  500 mg Intravenous Q24H     Continuous Infusions:   sodium chloride 75 mL/hr at 02/11/21 1032     PRN medications: sodium chloride flush, promethazine **OR** ondansetron, polyethylene glycol, acetaminophen **OR** acetaminophen Allergies: No Known Allergies  Vital Signs / Exam     Vitals:    21 0625 21 0657 21 0700 21 1030   BP:  (!) 101/47  116/62   Pulse: 85 84  92   Resp: 20 20  22   Temp:  99.5 °F (37.5 °C)  99.4 °F (37.4 °C)   TempSrc:  Axillary  Axillary   SpO2: 94% 94%  92%   Weight:       Height:   5' (1.524 m)      Wt Readings from Last 10 Encounters:   21 110 lb (49.9 kg)     Admit Wt: Weight: 110 lb (49.9 kg)   Todays Wt: Weight: 110 lb (49.9 kg)  Average, Min, and Max for last 24 hours Vitals:  TEMPERATURE:  Temp  Av.4 °F (37.4 °C)  Min: 99.2 °F (37.3 °C)  Max: 99.5 °F (37.5 °C)  RESPIRATIONS RANGE: Resp  Av.6  Min: 18  Max: 22  PULSE RANGE: Pulse  Av  Min: 73  Max: 92  BLOOD PRESSURE RANGE:    Systolic (79JAM), MTQ:533 , Min:101 , XRP:653     Diastolic (37PLC), NJE:81, Min:47, Max:95    PULSE OXIMETRY RANGE: SpO2  Av.6 %  Min: 92 %  Max: 100 %    Estimated body mass index is 21.48 kg/m² as calculated from the following:    Height as of this encounter: 5' (1.524 m). Weight as of this encounter: 110 lb (49.9 kg). No intake or output data in the 24 hours ending 21 1054  No intake/output data recorded. Appearance Appearing acute on chronically ill/frail,  Appears stated age. CV  Tachycardic, no heave. Respiratory Tachypneic, agonal breathing later in evening + air hunger  MSK  No clubbing or cyanosis,  warm well perfused, no obvious edema + Variegated skin changes; PVD  HEENT  NC/AT, Pupils equal & round, Sclera anicteric. Hearing unable to be assessed, normal external ears and nares, mucous membranes DRY. Chest  Symmetric, normal shape and expansion, no tenderness on chest wall. Skin  No rashes seen, skin feels warm and dry  Psychiatric Agitated, altered affect, limited insight    Doyle placed.   Neurologic No tremors, no myoclonus, no asterixis + witnessed seizure like activity then relaxed having agonal breathing  Abdomen Soft, NT/ND, BS present Neck    Trachea midline; supple, no cervical or supraclavicular lymphadenopathy grossly noted  Laboratory / Diagnostic Data     Recent Labs     02/11/21  0400   WBC 13.3*   HGB 10.9*   HCT 34.2*   MCV 94.8        Recent Labs     02/11/21  0400 02/11/21  1010     --    K 4.9  --      --    CO2 22  --    BUN 72*  --    CREATININE 2.5*  --    GLUCOSE 105*  --    CALCIUM 8.8  --    ANIONGAP 12  --    TROPONINI 0.05* 0.06*     Estimated Creatinine Clearance: 10 mL/min (A) (based on SCr of 2.5 mg/dL (H)). Recent Labs     02/11/21  0400   ALT 23   AST 38*   ALKPHOS 188*   BILITOT 0.6     No results for input(s): INR, PROTIME, PTT in the last 72 hours.   Lab Results   Component Value Date    CALCIUM 8.8 02/11/2021      No results found for: IRON, TIBC, FERRITIN, [id number], FOLATE, RETICCTPCT  No results found for: LABA1C  Lab Results   Component Value Date    COLORU YELLOW 02/11/2021    CLARITYU Clear 02/11/2021    PHUR 5.5 02/11/2021    GLUCOSEU Negative 02/11/2021    BLOODU Negative 02/11/2021    LEUKOCYTESUR Negative 02/11/2021    BILIRUBINUR Negative 02/11/2021    UROBILINOGEN 1.0 02/11/2021    RBCUA 3-4 02/11/2021    WBCUA 12 (H) 02/11/2021      No results found for: HAV, HEPAIGM, HEPBIGM, HEPBCAB, HBEAG, HEPCAB   No results found for: AEROBOT, ANABOT, LABGRAM, ISO2, ISO3, ISO4, ISO5, ISO6       Xr Pelvis (1-2 Views)    Result Date: 2/11/2021 EXAMINATION: ONE XRAY VIEW OF THE PELVIS 2/11/2021 9:50 am COMPARISON: None. HISTORY: ORDERING SYSTEM PROVIDED HISTORY: left pubic rami fracture TECHNOLOGIST PROVIDED HISTORY: Reason for exam:->left pubic rami fracture Reason for Exam: left pubic rami fracture Acuity: Unknown Type of Exam: Unknown FINDINGS: Bilateral fractures at the junction of the superior pubic rami and acetabula, and inferior pubic rami and ischia. Pubic bone appears inferiorly displaced as a unit. SI joints are symmetric. No acetabular wall fracture demonstrated. Status post left hip arthroplasty. Severe lumbar degenerative disease incidentally noted     Bilateral superior and inferior pubic ramus fractures with inferior displacement of the pubic bone     Xr Wrist Left (2 Views)    Result Date: 2/11/2021  EXAMINATION: XRAY VIEWS OF THE LEFT WRIST 2/11/2021 9:50 am COMPARISON: None. HISTORY: ORDERING SYSTEM PROVIDED HISTORY: distal radius fracture TECHNOLOGIST PROVIDED HISTORY: Reason for exam:->distal radius fracture Reason for Exam: distal radius fracture Acuity: Unknown Type of Exam: Unknown FINDINGS: Impacted fracture of the distal radial metaphysis with slight dorsal angulation. No convincing articular surface extension. Concurrent longitudinal fracture extending obliquely through the metadiaphysis to the medial cortex without displacement. Fractures of the base and tip of the ulnar styloid. No carpal bone fracture demonstrated.   Radiocarpal joint intact     Distal radius and ulnar styloid fractures     Ct Head Wo Contrast    Result Date: 2/11/2021 EXAMINATION: XRAY VIEWS OF THE LEFT FINGER 2/11/2021 9:50 am COMPARISON: None. HISTORY: ORDERING SYSTEM PROVIDED HISTORY: 5th digit proximal phalanx fracture TECHNOLOGIST PROVIDED HISTORY: Reason for exam:->5th digit proximal phalanx fracture Reason for Exam: 5th digit proximal phalanx fracture Acuity: Unknown Type of Exam: Unknown FINDINGS: Fracture through the proximal shaft of the 5th proximal phalanx, predominantly transverse. No displacement or significant angulation. No intra-articular extension. No dislocation     Fracture of the 5th proximal phalanx     Xr Chest Portable    Result Date: 2/11/2021  EXAMINATION: ONE XRAY VIEW OF THE CHEST 2/11/2021 4:08 am COMPARISON: None. HISTORY: ORDERING SYSTEM PROVIDED HISTORY: R/O PNA TECHNOLOGIST PROVIDED HISTORY: Reason for exam:->R/O PNA Reason for Exam: R/O PNA Acuity: Acute Type of Exam: Initial Shortness of breath, leukocytosis FINDINGS: There are ill-defined airspace opacities in both lungs. Trace left pleural effusion may be present. The heart size is normal.  The aorta is tortuous. There is no discernible pneumothorax. Pulmonary edema versus bilateral pneumonia. Prior TTE:  No results found for this or any previous visit. Morbidity / complication risk is felt to be: HIGH  In addition to the above personally reviewed labs, vitals, imaging and chart.

## 2021-02-11 NOTE — PROGRESS NOTES
Patient is yelling out and is extremely confused/agitated. Morphine and haldol already given no change. Stuffed animal given to help ease along with extra pillows and some music, no relief noted. Restraints have been off since daughter Arleen Reyes got here she is not needing them still at this time.

## 2021-02-11 NOTE — PROGRESS NOTES
Objective:  BP (!) 101/47   Pulse 84   Temp 99.5 °F (37.5 °C) (Axillary)   Resp 20   Ht 5' (1.524 m)   Wt 110 lb (49.9 kg)   SpO2 94%   BMI 21.48 kg/m²   No intake or output data in the 24 hours ending 02/11/21 0804   Wt Readings from Last 3 Encounters:   02/11/21 110 lb (49.9 kg)       General appearance:  Agitated, yelling out, pulling at tubing  ENT: Dry oral mucosa. Cardiovascular: Regular rhythm, normal S1, S2. No murmur. No edema in lower extremities  Respiratory: Not using accessory muscles. Decreased, no wheezes. GI: Abdomen soft, not distended, normal bowel sounds  Musculoskeletal: No cyanosis in digits, neck supple  Neurology: Moves all extremities, unable to follow command  Psych: Confused, agitated  Skin: Warm, dry    Labs and Tests:  CBC:   Recent Labs     02/11/21  0400   WBC 13.3*   HGB 10.9*        BMP:    Recent Labs     02/11/21  0400      K 4.9      CO2 22   BUN 72*   CREATININE 2.5*   GLUCOSE 105*     Hepatic:   Recent Labs     02/11/21  0400   AST 38*   ALT 23   BILITOT 0.6   ALKPHOS 188*       CXR 2/11/2021:  Pulmonary edema versus bilateral pneumonia. CT Head 2/11/2021:  Limited study with small vessel chronic ischemic changes but no definite   acute intracranial abnormality.             Problem List  Active Problems:    PNA (pneumonia)  Resolved Problems:    * No resolved hospital problems. *       Assessment & Plan:   1. Acute encephalopathy. CT scan head with chronic ischemic change but no acute abnormality. Most likely secondary to acute infection. Patient agitated, pulling at lines. Currently in soft restraints. Received haldol in ER without effect. Will repeat haldol at higher dose. 2. Pneumonia. CXR consistent with bilateral pneumonia, WBC 13.3, procalcitonin 0.50. Started on azithromycin and ceftriaxone. Follow WBC and fever curve. 3. RITA. Creatinine 2.5 on presentation, baseline appears ~ 1.0. Receiving IV fluids. Nephrology consult pending.

## 2021-02-11 NOTE — PROGRESS NOTES
Physical Therapy  Order received, chart reviewed. Pt. Noted to be agitated, spoke with RN who reports Pt. In restraints and haldol being given. RN, Zoltan Lopez, held PT/OT evaluation at this time. Will reattempt when Pt more appropriate.   Deann Diaz, [address] [facility], [address] [place] [address], [facility] OTR/L [id number]

## 2021-02-11 NOTE — PROGRESS NOTES
Kendra Vee called from ultrasound and stated renal orders where not done here that where ordered. Orders need to be ordered via the following:    U/s urinary bladder.   U/s renal complete

## 2021-02-12 LAB — URINE CULTURE, ROUTINE: NORMAL

## 2021-02-12 NOTE — SIGNIFICANT EVENT
\" Death Pronunciation\" Note     Patient Active Problem List   Diagnosis    PNA (pneumonia)    Closed fracture of left distal radius       Date of death : 21  Time of death :     Patient seen and examined . Noted no spontaneous breathing efforts   Pupils fixed and dilated   Pulseless   No recordable BP    EKG Flat line     Declared  @     Primary attending to be notified by Staff     Discharge Summary will be completed by \" Dr Prashanth Adamson \"  who was the primary attending on the patient while being Rxed @ [facility], [place]    2021 9:06 PM    [address] [place] [address]

## 2021-02-12 NOTE — PROGRESS NOTES
Called into room to assist primary nurse, Vicente Mcdaniels, found patient with no pulse, no respirations, and no audible heart tones upon auscultation.

## 2021-02-12 NOTE — SIGNIFICANT EVENT
Discussed w. Villaseñor/Daughter     She wishes to change Code status to DNR CC and focus on Comfort only for now     No CPR   No shock   No intubation   No resuscitative medications   No heroic measures       She states That her mother has lived a good long life and her wishes was always to die a natural death . Comfort medications ordered   Daughter/Villaseñor understands That there are adverse effects a/w these medications w/c can expedite the process of dying and she is agreeable and at peace with her decision . D/w staff     [facility], MD    2/11/2021 7:54 PM    Hospitalist - [employer].

## 2021-02-13 NOTE — PROGRESS NOTES
Physician Progress Note      Niru Day  [facility] #:                  [id number]  :                       3/7/1925  ADMIT DATE:       2021 3:48 AM  DISCH DATE:        2021 8:40 PM  RESPONDING  PROVIDER #:        Esme FINN APRN - CNP        QUERY TEXT:    Type of Encephalopathy: Please provide further specificity, if known. Clinical indicators include: altered mental status, acute, encephalopathy,   infection, fever  Options provided:  -- Anoxic/hypoxic encephalopathy  -- Metabolic encephalopathy  -- Toxic encephalopathy  -- Hepatic encephalopathy  -- Hypertensive encephalopathy        PROVIDER RESPONSE TEXT:    The patient has metabolic encephalopathy. QUERY TEXT:    Dear Kenneth Rizzo,    Pt admitted with pneumonia. Pt noted to have WBC elevation. If possible,   please document in the progress notes and discharge summary if you are   evaluating and /or treating any of the following: The medical record reflects the following:  Risk Factors: Age, Recent fall, Dementia,  Pneumonia. Clinical Indicators: Patient presented from home with dyspnea, has had recent   fall with fractures, was being treated OP for symptoms of UTI also, culture   pending. Noted on CXR with ill defined airspace opacities pulmonary edema vs   bilateral pneumonia. Elevated WBC at 13.3, LA of 1.8 with procalcitonin of   0.50. Patient also presents with acute kidney injury with change in mental   status. Treatment: IV fluids, Azithromycin, Rocephin, lab work, Imaging, Oxygen   therapy, SPO2 monitoring. Thank you. [email or web address]  Options provided:  -- Sepsis, present on admission  -- No Sepsis, pneumonia only  -- Sepsis was ruled out  -- Other - I will add my own diagnosis  -- Disagree - Not applicable / Not valid  -- Disagree - Clinically unable to determine / Unknown  -- Refer to Clinical Documentation Reviewer    PROVIDER RESPONSE TEXT: This patient has pneumonia only, patient is not septic.     Query created by: Olivia Murrieta on 2/11/2021 1:52 PM      Electronically signed by:  Juan Carlos Ramírez CNP 2/12/2021 9:09 PM

## 2021-06-02 ENCOUNTER — CLINICAL DOCUMENTATION (OUTPATIENT)
Dept: OTHER | Age: 86
End: 2021-06-02